# Patient Record
Sex: MALE | Race: BLACK OR AFRICAN AMERICAN | NOT HISPANIC OR LATINO | Employment: OTHER | URBAN - METROPOLITAN AREA
[De-identification: names, ages, dates, MRNs, and addresses within clinical notes are randomized per-mention and may not be internally consistent; named-entity substitution may affect disease eponyms.]

---

## 2017-05-10 ENCOUNTER — HISTORICAL (OUTPATIENT)
Dept: RADIOLOGY | Facility: HOSPITAL | Age: 62
End: 2017-05-10

## 2017-05-22 ENCOUNTER — HISTORICAL (OUTPATIENT)
Dept: SURGERY | Facility: HOSPITAL | Age: 62
End: 2017-05-22

## 2017-05-22 LAB
ALBUMIN SERPL-MCNC: 4.2 GM/DL (ref 3.4–5)
ALBUMIN/GLOB SERPL: 1.2 RATIO
ALP SERPL-CCNC: 53 UNIT/L (ref 30–113)
ALT SERPL-CCNC: 36 UNIT/L (ref 10–45)
APTT PPP: 26.1 SECOND(S) (ref 25–35)
AST SERPL-CCNC: 39 UNIT/L (ref 15–37)
BASOPHILS NFR BLD MANUAL: 0 % (ref 0–1)
BILIRUB SERPL-MCNC: 0.3 MG/DL (ref 0.1–0.9)
BILIRUBIN DIRECT+TOT PNL SERPL-MCNC: 0.1 MG/DL
BILIRUBIN DIRECT+TOT PNL SERPL-MCNC: 0.2 MG/DL (ref 0–0.3)
BUN SERPL-MCNC: 16 MG/DL (ref 10–20)
CALCIUM SERPL-MCNC: 8.9 MG/DL (ref 8–10.5)
CHLORIDE SERPL-SCNC: 105 MMOL/L (ref 100–108)
CO2 SERPL-SCNC: 28 MMOL/L (ref 21–35)
CREAT SERPL-MCNC: 0.99 MG/DL (ref 0.7–1.3)
EOSINOPHIL NFR BLD MANUAL: 0 % (ref 0–5)
ERYTHROCYTE [DISTWIDTH] IN BLOOD BY AUTOMATED COUNT: 14.1 % (ref 11.5–17)
GLOBULIN SER-MCNC: 3.6 GM/DL
GLUCOSE SERPL-MCNC: 92 MG/DL (ref 75–116)
GRANULOCYTES NFR BLD MANUAL: 79 % (ref 47–80)
HCT VFR BLD AUTO: 42.9 % (ref 42–52)
HEMOCCULT SP1 STL QL: NEGATIVE
HEMOCCULT SP2 STL QL: NEGATIVE
HGB BLD-MCNC: 13.7 GM/DL (ref 14–18)
HYPOCHROMIA BLD QL SMEAR: ABNORMAL
INR PPP: 1 (ref 0–1.2)
LYMPHOCYTES NFR BLD MANUAL: 9 % (ref 15–40)
MCH RBC QN AUTO: 26 PG (ref 27–34)
MCHC RBC AUTO-ENTMCNC: 32 GM/DL (ref 31–36)
MCV RBC AUTO: 83 FL (ref 80–99)
MONOCYTES NFR BLD MANUAL: 12 % (ref 4–12)
PLATELET # BLD AUTO: 317 X10(3)/MCL (ref 140–400)
PLATELET # BLD EST: ADEQUATE 10*3/UL
PMV BLD AUTO: 9.6 FL (ref 6.8–10)
POTASSIUM SERPL-SCNC: 3.9 MMOL/L (ref 3.6–5.2)
PROT SERPL-MCNC: 7.8 GM/DL (ref 6.4–8.2)
PROTHROMBIN TIME: 10.8 SECOND(S) (ref 9–12)
RBC # BLD AUTO: 5.18 X10(6)/MCL (ref 4.7–6.1)
SODIUM SERPL-SCNC: 142 MMOL/L (ref 135–145)
WBC # SPEC AUTO: 7.4 X10(3)/MCL (ref 4.5–11.5)

## 2017-05-25 ENCOUNTER — HISTORICAL (OUTPATIENT)
Dept: ANESTHESIOLOGY | Facility: HOSPITAL | Age: 62
End: 2017-05-25

## 2017-08-10 ENCOUNTER — HISTORICAL (OUTPATIENT)
Dept: SURGERY | Facility: HOSPITAL | Age: 62
End: 2017-08-10

## 2017-08-10 LAB
ABS NEUT (OLG): 5.9 X10(3)/MCL (ref 1.5–6.9)
ALBUMIN SERPL-MCNC: 4.4 GM/DL (ref 3.4–5)
ALBUMIN/GLOB SERPL: 1.3 RATIO
ALP SERPL-CCNC: 178 UNIT/L (ref 30–113)
ALT SERPL-CCNC: 26 UNIT/L (ref 10–45)
APTT PPP: 23.4 SECOND(S) (ref 25–35)
AST SERPL-CCNC: 28 UNIT/L (ref 15–37)
BASOPHILS # BLD AUTO: 0 X10(3)/MCL (ref 0–0.1)
BASOPHILS NFR BLD AUTO: 0 % (ref 0–1)
BILIRUB SERPL-MCNC: 0.5 MG/DL (ref 0.1–0.9)
BILIRUBIN DIRECT+TOT PNL SERPL-MCNC: 0.2 MG/DL (ref 0–0.3)
BILIRUBIN DIRECT+TOT PNL SERPL-MCNC: 0.3 MG/DL
BUN SERPL-MCNC: 18 MG/DL (ref 10–20)
CALCIUM SERPL-MCNC: 8.8 MG/DL (ref 8–10.5)
CHLORIDE SERPL-SCNC: 103 MMOL/L (ref 100–108)
CO2 SERPL-SCNC: 32 MMOL/L (ref 21–35)
CREAT SERPL-MCNC: 1.09 MG/DL (ref 0.7–1.3)
EOSINOPHIL # BLD AUTO: 0.2 X10(3)/MCL (ref 0–0.6)
EOSINOPHIL NFR BLD AUTO: 3 % (ref 0–5)
ERYTHROCYTE [DISTWIDTH] IN BLOOD BY AUTOMATED COUNT: 15.6 % (ref 11.5–17)
GLOBULIN SER-MCNC: 3.5 GM/DL
GLUCOSE SERPL-MCNC: 101 MG/DL (ref 75–116)
HCT VFR BLD AUTO: 39.3 % (ref 42–52)
HGB BLD-MCNC: 12.6 GM/DL (ref 14–18)
INR PPP: 1 (ref 0–1.2)
LYMPHOCYTES # BLD AUTO: 2 X10(3)/MCL (ref 0.5–4.1)
LYMPHOCYTES NFR BLD AUTO: 22.8 % (ref 15–40)
MCH RBC QN AUTO: 26 PG (ref 27–34)
MCHC RBC AUTO-ENTMCNC: 32 GM/DL (ref 31–36)
MCV RBC AUTO: 82 FL (ref 80–99)
MONOCYTES # BLD AUTO: 0.6 X10(3)/MCL (ref 0–1.1)
MONOCYTES NFR BLD AUTO: 7 % (ref 4–12)
NEUTROPHILS # BLD AUTO: 5.9 X10(3)/MCL (ref 1.5–6.9)
NEUTROPHILS NFR BLD AUTO: 67 % (ref 43–75)
PLATELET # BLD AUTO: 388 X10(3)/MCL (ref 140–400)
PMV BLD AUTO: 9.6 FL (ref 6.8–10)
POTASSIUM SERPL-SCNC: 4.4 MMOL/L (ref 3.6–5.2)
PROT SERPL-MCNC: 7.9 GM/DL (ref 6.4–8.2)
PROTHROMBIN TIME: 10.1 SECOND(S) (ref 9–12)
RBC # BLD AUTO: 4.78 X10(6)/MCL (ref 4.7–6.1)
SODIUM SERPL-SCNC: 139 MMOL/L (ref 135–145)
WBC # SPEC AUTO: 8.8 X10(3)/MCL (ref 4.5–11.5)

## 2017-08-21 ENCOUNTER — HISTORICAL (OUTPATIENT)
Dept: ANESTHESIOLOGY | Facility: HOSPITAL | Age: 62
End: 2017-08-21

## 2017-12-06 ENCOUNTER — HISTORICAL (OUTPATIENT)
Dept: RADIOLOGY | Facility: HOSPITAL | Age: 62
End: 2017-12-06

## 2018-09-27 ENCOUNTER — HISTORICAL (OUTPATIENT)
Dept: RADIOLOGY | Facility: HOSPITAL | Age: 63
End: 2018-09-27

## 2019-09-06 ENCOUNTER — HISTORICAL (OUTPATIENT)
Dept: LAB | Facility: HOSPITAL | Age: 64
End: 2019-09-06

## 2019-11-08 ENCOUNTER — HISTORICAL (OUTPATIENT)
Dept: RESPIRATORY THERAPY | Facility: HOSPITAL | Age: 64
End: 2019-11-08

## 2021-10-08 ENCOUNTER — HISTORICAL (OUTPATIENT)
Dept: LAB | Facility: HOSPITAL | Age: 66
End: 2021-10-08

## 2021-10-08 LAB
HEMOCCULT SP1 STL QL: POSITIVE
HEMOCCULT SP2 STL QL: POSITIVE

## 2021-10-18 ENCOUNTER — HISTORICAL (OUTPATIENT)
Dept: RADIOLOGY | Facility: HOSPITAL | Age: 66
End: 2021-10-18

## 2021-11-15 ENCOUNTER — HISTORICAL (OUTPATIENT)
Dept: LAB | Facility: HOSPITAL | Age: 66
End: 2021-11-15

## 2021-11-24 ENCOUNTER — HISTORICAL (OUTPATIENT)
Dept: RADIOLOGY | Facility: HOSPITAL | Age: 66
End: 2021-11-24

## 2021-11-24 ENCOUNTER — HISTORICAL (OUTPATIENT)
Dept: SURGERY | Facility: HOSPITAL | Age: 66
End: 2021-11-24

## 2021-11-24 LAB
ABS NEUT (OLG): 4.6 X10(3)/MCL (ref 1.5–6.9)
ALBUMIN SERPL-MCNC: 4.3 GM/DL (ref 3.4–4.8)
ALBUMIN/GLOB SERPL: 1.6 RATIO (ref 1.1–2)
ALP SERPL-CCNC: 87 UNIT/L (ref 40–150)
ALT SERPL-CCNC: 45 UNIT/L (ref 0–55)
APTT PPP: 30.4 SEC (ref 23.4–34.9)
AST SERPL-CCNC: 48 UNIT/L (ref 5–34)
BASOPHILS # BLD AUTO: 0 X10(3)/MCL (ref 0–0.1)
BASOPHILS NFR BLD AUTO: 1 % (ref 0–1)
BILIRUB SERPL-MCNC: 0.8 MG/DL
BILIRUBIN DIRECT+TOT PNL SERPL-MCNC: 0.4 MG/DL (ref 0–0.5)
BILIRUBIN DIRECT+TOT PNL SERPL-MCNC: 0.4 MG/DL (ref 0–0.8)
BUN SERPL-MCNC: 11 MG/DL (ref 8.4–25.7)
CALCIUM SERPL-MCNC: 9.6 MG/DL (ref 8.7–10.5)
CHLORIDE SERPL-SCNC: 107 MMOL/L (ref 98–107)
CO2 SERPL-SCNC: 25 MMOL/L (ref 23–31)
CREAT SERPL-MCNC: 0.85 MG/DL (ref 0.73–1.18)
EOSINOPHIL # BLD AUTO: 0.1 X10(3)/MCL (ref 0–0.6)
EOSINOPHIL NFR BLD AUTO: 2 % (ref 0–5)
ERYTHROCYTE [DISTWIDTH] IN BLOOD BY AUTOMATED COUNT: 14.7 % (ref 11.5–17)
GLOBULIN SER-MCNC: 2.7 GM/DL (ref 2.4–3.5)
GLUCOSE SERPL-MCNC: 143 MG/DL (ref 82–115)
HCT VFR BLD AUTO: 42.1 % (ref 42–52)
HGB BLD-MCNC: 13.1 GM/DL (ref 14–18)
IMM GRANULOCYTES # BLD AUTO: 0.02 10*3/UL (ref 0–0.02)
IMM GRANULOCYTES NFR BLD AUTO: 0.3 % (ref 0–0.43)
INR PPP: 1 (ref 2–3)
LYMPHOCYTES # BLD AUTO: 1.4 X10(3)/MCL (ref 0.5–4.1)
LYMPHOCYTES NFR BLD AUTO: 20 % (ref 15–40)
MCH RBC QN AUTO: 27 PG (ref 27–34)
MCHC RBC AUTO-ENTMCNC: 31 GM/DL (ref 31–36)
MCV RBC AUTO: 86 FL (ref 80–99)
MONOCYTES # BLD AUTO: 0.5 X10(3)/MCL (ref 0–1.1)
MONOCYTES NFR BLD AUTO: 8 % (ref 4–12)
NEUTROPHILS # BLD AUTO: 4.6 X10(3)/MCL (ref 1.5–6.9)
NEUTROPHILS NFR BLD AUTO: 69 % (ref 43–75)
PLATELET # BLD AUTO: 294 X10(3)/MCL (ref 140–400)
PMV BLD AUTO: 10.4 FL (ref 6.8–10)
POTASSIUM SERPL-SCNC: 3.7 MMOL/L (ref 3.5–5.1)
PROT SERPL-MCNC: 7 GM/DL (ref 5.8–7.6)
PROTHROMBIN TIME: 13.4 SEC (ref 11.7–14.5)
RBC # BLD AUTO: 4.9 X10(6)/MCL (ref 4.7–6.1)
SODIUM SERPL-SCNC: 143 MMOL/L (ref 136–145)
WBC # SPEC AUTO: 6.6 X10(3)/MCL (ref 4.5–11.5)

## 2021-11-29 ENCOUNTER — HISTORICAL (OUTPATIENT)
Dept: ANESTHESIOLOGY | Facility: HOSPITAL | Age: 66
End: 2021-11-29

## 2021-12-02 ENCOUNTER — HISTORICAL (OUTPATIENT)
Dept: ANESTHESIOLOGY | Facility: HOSPITAL | Age: 66
End: 2021-12-02

## 2021-12-09 ENCOUNTER — HISTORICAL (OUTPATIENT)
Dept: RADIOLOGY | Facility: HOSPITAL | Age: 66
End: 2021-12-09

## 2022-01-26 ENCOUNTER — HISTORICAL (OUTPATIENT)
Dept: RADIOLOGY | Facility: HOSPITAL | Age: 67
End: 2022-01-26

## 2022-02-01 ENCOUNTER — HISTORICAL (OUTPATIENT)
Dept: ADMINISTRATIVE | Facility: HOSPITAL | Age: 67
End: 2022-02-01

## 2022-02-01 ENCOUNTER — HISTORICAL (OUTPATIENT)
Dept: RADIOLOGY | Facility: HOSPITAL | Age: 67
End: 2022-02-01

## 2022-02-07 ENCOUNTER — HISTORICAL (OUTPATIENT)
Dept: LAB | Facility: HOSPITAL | Age: 67
End: 2022-02-07

## 2022-04-30 NOTE — OP NOTE
ADMITTING DIAGNOSIS:  Need for age-appropriate screening colonoscopy.    PROCEDURE:  Colonoscopy to the cecum with intubation of ileocecal valve.    POSTOPERATIVE DIAGNOSES:    1. Normal terminal ileum.   2. Normal colonoscopy.  3. Grade 2 internal hemorrhoids.    DESCRIPTION OF PROCEDURE:  Patient is a 62-year-old  male with hypertension, had a colonoscopy in 2009, needed a followup screening colonoscopy.  Referred for the purpose of screening colonoscopy.  Patient was brought to the GI suite, underwent sedation and examination of the colon all the way to the cecum.  The ileocecal valve was briefly intubated, found to be within normal limits.  Cecum, ascending colon, transverse colon, descending colon, rectosigmoid were unremarkable.  Digital exam reveals good tone.  No masses.  No other abnormalities were seen.  Overall, the patient did very well.  Had no problems or difficulties, was awakened and sent to recovery in good condition.  I appreciate the consultation referral from his primary care provider, Ms Zoie Doe, and will notify her of my findings.        HENRY/SHELLIE   DD: 08/21/2017 1819   DT: 08/21/2017 1839  Job # [id number]    cc: Zoie Doe N.P.

## 2022-04-30 NOTE — OP NOTE
DATE OF SURGERY:        SURGEON:  Shai Horn M.D.    ADMITTING DIAGNOSIS:  Occult positive stools in need of colonoscopic evaluation.    POSTOPERATIVE DIAGNOSES:    1. Normal terminal ileum up to 10 cm.   2. Normal colonoscopy with mild diverticulosis.   3. Grade 2 internal hemorrhoids.    BRIEF HISTORY:  The patient is a 66-year-old  male with hypertension. Had a colonoscopy in 2009.  Recently developed occult positive stools, 2/3 were positive for blood on 10/08/2021. The patient was referred to me for endoscopic evaluation of the colon all the way to the cecum with intubation of ileocecal valve.  The terminal ileum was normal.       The patient had examination of the colon all the way to the cecum with intubation of ileocecal valve.  Terminal ileum was normal up to 10 cm.  The cecum, ascending colon, transverse colon, descending colon were normal. Rectosigmoid had some mild diverticulosis.  Grade 2 internal hemorrhoids were seen. No other pathology was visualized.  Overall, the patient did very well. Had no problems or difficulties.  Was awakened and sent to Recovery in good condition.    PLAN:    1. Follow up in the office to discuss results.   2. Follow up in 2 years to recheck stools for blood.   3. Follow up in 10 years for repeat screening colonoscopy.   4. Check an EGD on 12/02/2021.    5.   Check a stool PCR as well.    6. I will notify Mr. Cantrell of my findings.        BBS/MODL   DD: 11/29/2021 1224   DT: 11/29/2021 1241  Job # [id number]    cc: Javon Cantrell

## 2022-04-30 NOTE — OP NOTE
PREOPERATIVE DIAGNOSIS:  Supraumbilical ventral hernia.    PROCEDURE:  Laparoscopic repair of a supraumbilical ventral hernia with a 9 cm __________mesh.    POSTOPERATIVE DIAGNOSIS:  Successful laparoscopic repair of an incarcerated supraumbilical abdominal hernia with no obstruction and no gangrenous bowel.  Only with incarcerated omentum and preperitoneal fat.    INDICATIONS:  The patient is a 61-year-old  male with a history of hypertension, who had a ventral hernia that was 2 cm in size that required repair.    PROCEDURE IN DETAIL:  The patient was brought to the operating room.  Supine position.  General anesthetic.  Prepped and draped in a sterile fashion.  Had a Visiport inserted in the left upper quadrant.  Then two 5 mm trocars placed in the right and left lower quadrants.  The supraumbilical hernia was identified and reduced laparoscopically and then had preperitoneal space dissected out.  Once this was done, the patient underwent placement of a 9 cm __________mesh over what appeared to be a 2 cm defect after the preperitoneal contents were reduced.  The patient had the mesh tacked with ProTack and SecureStrap and then had peritoneum covering the nonadhesive side and the adhesive side was toward the fascia.  The patient had good securing of the mesh and once this was done, the aponeuroses of the two 5 mm trocar sites and of the 10 mm trocar sites were closed with 0 Vicryl on a  needle.  Subcu was closed with 4-0 plain gut suture.  Dermabond was used for the skin.  Sterile dressings were applied.  No problems encountered.  The patient tolerated the procedure well.       I appreciate the consultation referral from his primary care provider, Ms Zoie Doe.      HENRY/SHELLIE   DD: 05/25/2017 1606   DT: 05/25/2017 1622  Job # [id number]    cc: Zoie Doe N.P.

## 2022-04-30 NOTE — OP NOTE
DATE OF SURGERY:        SURGEON:  Shai Horn M.D.    ADMITTING DIAGNOSES:    1. Occult positive stools, etiology unclear.  2. Right upper quadrant abdominal pain with diarrhea.  3. 10/08/2021 stools for blood positive 2/3.    PROCEDURE:  Esophagogastroduodenoscopy with biopsy of the antrum.    POSTOPERATIVE DIAGNOSES:    1. Normal duodenum in all 4 portions and into the jejunum.  2. Normal antrum, fundus and cardia of the stomach.  Biopsy of the antrum taken for histopathology and Helicobacter pylori.  Z-line at 40 cm.    3. No hiatal hernia with grade 1 esophageal varices.  No active bleeding.  4. Normal esophagus, cricopharyngeus muscle and vocal cords proximally and mid body.    5. There is no obvious active source of bleeding.    INDICATIONS:  The patient is a 66-year-old  male with a history of hypertension who had a colonoscopy in 2009, recently had stools for blood that were 2/3 positive on 10/08/2021.  He has right upper quadrant pain and associated diarrhea.  No nausea, bloating, or vomiting.  Patient was referred to me for endoscopic evaluation by Mr. Javon Figueredo.  The patient had stool PCRs checked.  Colonoscopy was scheduled for the 29th.  Completion EGD was also scheduled.    PROCEDURE IN DETAIL:  The patient was brought to the GI suite, underwent sedation and examination of esophagus, stomach and duodenum.  Duodenum was normal in all 4 portions and into the jejunum.  The pylorus was intubated without difficulty.  Antrum, fundus, and cardia of the stomach were normal.  The Z-line was at 40 cm.  There was no hiatal hernia.  There was grade 1 esophageal varices distally on the esophagus.  The midbody and proximal esophagus were normal.  Cricopharyngeus muscle and vocal cords were normal throughout.  No other abnormalities were seen.  Overall, the patient did very well, had no problems or difficulties.    PLAN:    1. Check stool PCRs.  2. Review EGD results and  colonoscopy in the office for further discussion as to possible source of occult bleeding.  3. Further review of his right upper quadrant tenderness with ultrasound HIDA scan.      I appreciate the consultation referral from . Javon Figueredo.        HENRY/SHELLIE   DD: 12/02/2021 1104   DT: 12/02/2021 1146  Job # [id number]    cc: Javon Figueredo

## 2022-12-12 ENCOUNTER — HOSPITAL ENCOUNTER (EMERGENCY)
Facility: HOSPITAL | Age: 67
Discharge: HOME | End: 2022-12-12
Attending: INTERNAL MEDICINE
Payer: MEDICARE

## 2022-12-12 VITALS
OXYGEN SATURATION: 100 % | SYSTOLIC BLOOD PRESSURE: 149 MMHG | TEMPERATURE: 98 F | RESPIRATION RATE: 18 BRPM | WEIGHT: 149.38 LBS | HEART RATE: 76 BPM | DIASTOLIC BLOOD PRESSURE: 78 MMHG
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
## 2022-12-12 DIAGNOSIS — S52.502A CLOSED FRACTURE OF DISTAL END OF LEFT RADIUS, UNSPECIFIED FRACTURE MORPHOLOGY, INITIAL ENCOUNTER: Primary | ICD-10-CM

## 2022-12-12 PROCEDURE — 25000003 PHARM REV CODE 250: Performed by: PHYSICIAN ASSISTANT

## 2022-12-12 PROCEDURE — 99283 EMERGENCY DEPT VISIT LOW MDM: CPT | Mod: 25

## 2022-12-12 RX ORDER — HYDROCODONE BITARTRATE AND ACETAMINOPHEN 10; 325 MG/1; MG/1
1 TABLET ORAL EVERY 6 HOURS PRN
Qty: 20 TABLET | Refills: 0 | Status: SHIPPED | OUTPATIENT
Start: 2022-12-12 | End: 2022-12-17

## 2022-12-12 RX ORDER — HYDROCODONE BITARTRATE AND ACETAMINOPHEN 10; 325 MG/1; MG/1
1 TABLET ORAL
Status: COMPLETED | OUTPATIENT
Start: 2022-12-12 | End: 2022-12-12

## 2022-12-12 RX ADMIN — HYDROCODONE BITARTRATE AND ACETAMINOPHEN 1 TABLET: 10; 325 TABLET ORAL at 10:12

## 2022-12-13 NOTE — DISCHARGE INSTRUCTIONS
Ice and elevate, 20 minutes on and 20 minutes off.  Keep arm in splint.  May use Norco for severe pain.  Do not drink or drive while taking narcotics as can be sedating.  Call Orthopedics tomorrow for follow-up appointment.

## 2022-12-13 NOTE — ED PROVIDER NOTES
Encounter Date: 12/12/2022       History     Chief Complaint   Patient presents with    Fall     C/o L wrist pain after trip and fall just PTA. Minor swelling noted.     67-year-old male presents to ED for evaluation after trip and fall.  Patient reports that he fell on outstretched left hand.  Denies hitting his head or loss of consciousness.  Complains of pain and swelling to left wrist.  Denies any nausea or vomiting.    The history is provided by the patient. No  was used.   Review of patient's allergies indicates:  No Known Allergies  Past Medical History:   Diagnosis Date    Hypertension      No past surgical history on file.  No family history on file.  Social History     Tobacco Use    Smoking status: Never    Smokeless tobacco: Never     Review of Systems   Constitutional:  Negative for chills, fatigue and fever.   HENT:  Negative for sore throat.    Respiratory:  Negative for cough and shortness of breath.    Cardiovascular:  Negative for chest pain.   Gastrointestinal:  Negative for abdominal pain, nausea and vomiting.   Genitourinary:  Negative for dysuria and frequency.   Musculoskeletal:  Positive for arthralgias, joint swelling and myalgias. Negative for back pain and neck pain.   Skin:  Negative for rash.   Neurological:  Negative for dizziness, weakness and headaches.   Hematological:  Does not bruise/bleed easily.   All other systems reviewed and are negative.    Physical Exam     Initial Vitals [12/12/22 2029]   BP Pulse Resp Temp SpO2   (!) 157/66 73 18 97.8 °F (36.6 °C) 99 %      MAP       --         Physical Exam    Nursing note and vitals reviewed.  Constitutional: He appears well-developed and well-nourished. He is cooperative.   HENT:   Head: Normocephalic and atraumatic.   Right Ear: External ear normal.   Left Ear: External ear normal.   Mouth/Throat: Oropharynx is clear and moist.   Eyes: Conjunctivae are normal. Pupils are equal, round, and reactive to light.   Neck:  Neck supple.   Normal range of motion.  Cardiovascular:  Normal rate, regular rhythm and normal heart sounds.           Pulmonary/Chest: Breath sounds normal. No respiratory distress. He has no wheezes. He has no rhonchi. He has no rales.   Abdominal: Abdomen is soft. Bowel sounds are normal. There is no abdominal tenderness.   Musculoskeletal:      Left wrist: Swelling, deformity and tenderness present. Decreased range of motion.        Arms:       Cervical back: Normal range of motion and neck supple.      Comments: Radial pulses 2+. All other adjacent joints otherwise normal       Neurological: He is alert and oriented to person, place, and time. He has normal strength. No cranial nerve deficit or sensory deficit. GCS score is 15. GCS eye subscore is 4. GCS verbal subscore is 5. GCS motor subscore is 6.   Skin: Skin is warm and dry. Capillary refill takes less than 2 seconds.   Psychiatric: He has a normal mood and affect.       ED Course   Procedures  Labs Reviewed - No data to display       Imaging Results               X-Ray Wrist Complete Left (Preliminary result)  Result time 12/12/22 21:58:22      ED Interpretation by ANTOINETTE Burrows (12/12/22 21:58:22, [facility]) Flagged as Abnormal    XR left wrist: left distal radius fracture noted                                     Medications   HYDROcodone-acetaminophen  mg per tablet 1 tablet (1 tablet Oral Given 12/12/22 2200)     Medical Decision Making:   Differential Diagnosis:   Fall, sprain, fracture  Clinical Tests:   Radiological Study: Ordered and Reviewed  ED Management:  67-year-old male presents to ED after trip and fall with left wrist pain.  Denies hitting his head or loss of consciousness.  GCS 15 and neuro intact.  X-ray showing left distal radius fracture.  Placed in splint.  Neurovascular intact  Will give short course of pain medication.  Discussed follow-up with Orthopedics, all questions  answered.  Patient verbalizes understanding and agrees with plan of care                        Clinical Impression:   Final diagnoses:  [W19.XXXA] Fall  [S52.502A] Closed fracture of distal end of left radius, unspecified fracture morphology, initial encounter (Primary)        ED Disposition Condition    Discharge Stable          ED Prescriptions       Medication Sig Dispense Start Date End Date Auth. Provider    HYDROcodone-acetaminophen (NORCO)  mg per tablet Take 1 tablet by mouth every 6 (six) hours as needed for Pain. 20 tablet 12/12/2022 12/17/2022 ANTOINETTE Burrows          Follow-up Information       Follow up With Specialties Details Why Contact Info    Javon Becerra NP Family Medicine   [address] [facility] [address]  [phone]      Eugenio Bronson MD Orthopedic Surgery Call   [address]  [place] [address]  [phone]               ANTOINETTE Burrows  12/12/22 4051

## 2023-01-09 ENCOUNTER — HOSPITAL ENCOUNTER (OUTPATIENT)
Dept: RADIOLOGY | Facility: HOSPITAL | Age: 68
Discharge: HOME | End: 2023-01-09
Attending: NURSE PRACTITIONER
Payer: MEDICARE

## 2023-01-09 DIAGNOSIS — S62.102A LEFT WRIST FRACTURE, CLOSED, INITIAL ENCOUNTER: ICD-10-CM

## 2023-01-09 PROCEDURE — 73110 X-RAY EXAM OF WRIST: CPT | Mod: TC,LT

## 2023-05-12 ENCOUNTER — HOSPITAL ENCOUNTER (OUTPATIENT)
Dept: RADIOLOGY | Facility: HOSPITAL | Age: 68
Discharge: HOME | End: 2023-05-12
Attending: NURSE PRACTITIONER
Payer: MEDICARE

## 2023-05-12 ENCOUNTER — CLINICAL SUPPORT (OUTPATIENT)
Dept: RESPIRATORY THERAPY | Facility: HOSPITAL | Age: 68
End: 2023-05-12
Attending: NURSE PRACTITIONER
Payer: MEDICARE

## 2023-05-12 DIAGNOSIS — I10 HYPERTENSION, ESSENTIAL: ICD-10-CM

## 2023-05-12 DIAGNOSIS — I10 ESSENTIAL HYPERTENSION, MALIGNANT: ICD-10-CM

## 2023-05-12 DIAGNOSIS — I10 ESSENTIAL HYPERTENSION, MALIGNANT: Primary | ICD-10-CM

## 2023-05-12 PROCEDURE — 93005 ELECTROCARDIOGRAM TRACING: CPT

## 2023-05-12 PROCEDURE — 93010 ELECTROCARDIOGRAM REPORT: CPT | Mod: ,,, | Performed by: INTERNAL MEDICINE

## 2023-05-12 PROCEDURE — 93010 EKG 12-LEAD: ICD-10-PCS | Mod: ,,, | Performed by: INTERNAL MEDICINE

## 2023-05-12 PROCEDURE — 71046 X-RAY EXAM CHEST 2 VIEWS: CPT | Mod: TC

## 2024-05-10 DIAGNOSIS — R63.4 WEIGHT LOSS: Primary | ICD-10-CM

## 2024-07-25 ENCOUNTER — HOSPITAL ENCOUNTER (OUTPATIENT)
Dept: RADIOLOGY | Facility: HOSPITAL | Age: 69
Discharge: HOME | End: 2024-07-25
Attending: NURSE PRACTITIONER
Payer: MEDICARE

## 2024-07-25 DIAGNOSIS — M54.50 LOW BACK PAIN AT MULTIPLE SITES: ICD-10-CM

## 2024-07-25 PROCEDURE — 72070 X-RAY EXAM THORAC SPINE 2VWS: CPT | Mod: TC

## 2024-07-25 PROCEDURE — 72100 X-RAY EXAM L-S SPINE 2/3 VWS: CPT | Mod: TC

## 2024-10-02 DIAGNOSIS — R22.41 NODULE OF SKIN OF RIGHT FOOT: ICD-10-CM

## 2024-10-02 DIAGNOSIS — M79.671 RIGHT FOOT PAIN: Primary | ICD-10-CM

## 2024-11-11 ENCOUNTER — HOSPITAL ENCOUNTER (OUTPATIENT)
Dept: RADIOLOGY | Facility: HOSPITAL | Age: 69
Discharge: HOME | End: 2024-11-11
Attending: NURSE PRACTITIONER
Payer: MEDICARE

## 2024-11-11 DIAGNOSIS — R22.41 NODULE OF SKIN OF RIGHT FOOT: ICD-10-CM

## 2024-11-11 DIAGNOSIS — M79.671 RIGHT FOOT PAIN: ICD-10-CM

## 2024-11-11 PROCEDURE — 76882 US LMTD JT/FCL EVL NVASC XTR: CPT | Mod: TC,RT

## 2025-05-16 DIAGNOSIS — D64.9 ANEMIA, UNSPECIFIED TYPE: Primary | ICD-10-CM

## 2025-06-02 ENCOUNTER — OFFICE VISIT (OUTPATIENT)
Facility: CLINIC | Age: 70
End: 2025-06-02
Payer: MEDICARE

## 2025-06-02 VITALS
RESPIRATION RATE: 18 BRPM | HEIGHT: 68 IN | HEART RATE: 65 BPM | DIASTOLIC BLOOD PRESSURE: 65 MMHG | WEIGHT: 140 LBS | BODY MASS INDEX: 21.22 KG/M2 | OXYGEN SATURATION: 100 % | TEMPERATURE: 98 F | SYSTOLIC BLOOD PRESSURE: 122 MMHG

## 2025-06-02 DIAGNOSIS — D64.9 ANEMIA, UNSPECIFIED TYPE: ICD-10-CM

## 2025-06-02 DIAGNOSIS — E53.8 FOLATE DEFICIENCY: Primary | ICD-10-CM

## 2025-06-02 PROBLEM — K21.9 GASTROESOPHAGEAL REFLUX DISEASE: Status: ACTIVE | Noted: 2025-06-02

## 2025-06-02 PROBLEM — I10 ESSENTIAL HYPERTENSION: Status: ACTIVE | Noted: 2025-06-02

## 2025-06-02 PROBLEM — M41.9 SCOLIOSIS: Status: ACTIVE | Noted: 2025-06-02

## 2025-06-02 PROBLEM — E78.5 HYPERLIPIDEMIA: Status: ACTIVE | Noted: 2025-06-02

## 2025-06-02 PROBLEM — F10.10 ALCOHOL ABUSE: Status: ACTIVE | Noted: 2025-06-02

## 2025-06-02 PROBLEM — J30.2 SEASONAL ALLERGIC RHINITIS: Status: ACTIVE | Noted: 2025-06-02

## 2025-06-02 PROBLEM — N52.9 ERECTILE DYSFUNCTION: Status: ACTIVE | Noted: 2025-06-02

## 2025-06-02 PROBLEM — M81.0 AGE-RELATED OSTEOPOROSIS WITHOUT CURRENT PATHOLOGICAL FRACTURE: Status: ACTIVE | Noted: 2025-06-02

## 2025-06-02 PROCEDURE — 1159F MED LIST DOCD IN RCRD: CPT | Mod: CPTII,S$GLB,, | Performed by: INTERNAL MEDICINE

## 2025-06-02 PROCEDURE — 99203 OFFICE O/P NEW LOW 30 MIN: CPT | Mod: S$GLB,,, | Performed by: INTERNAL MEDICINE

## 2025-06-02 PROCEDURE — 3288F FALL RISK ASSESSMENT DOCD: CPT | Mod: CPTII,S$GLB,, | Performed by: INTERNAL MEDICINE

## 2025-06-02 PROCEDURE — 3074F SYST BP LT 130 MM HG: CPT | Mod: CPTII,S$GLB,, | Performed by: INTERNAL MEDICINE

## 2025-06-02 PROCEDURE — 3078F DIAST BP <80 MM HG: CPT | Mod: CPTII,S$GLB,, | Performed by: INTERNAL MEDICINE

## 2025-06-02 PROCEDURE — 3008F BODY MASS INDEX DOCD: CPT | Mod: CPTII,S$GLB,, | Performed by: INTERNAL MEDICINE

## 2025-06-02 PROCEDURE — 1126F AMNT PAIN NOTED NONE PRSNT: CPT | Mod: CPTII,S$GLB,, | Performed by: INTERNAL MEDICINE

## 2025-06-02 PROCEDURE — 99999 PR PBB SHADOW E&M-EST. PATIENT-LVL IV: CPT | Mod: PBBFAC,,, | Performed by: INTERNAL MEDICINE

## 2025-06-02 PROCEDURE — 1101F PT FALLS ASSESS-DOCD LE1/YR: CPT | Mod: CPTII,S$GLB,, | Performed by: INTERNAL MEDICINE

## 2025-06-02 RX ORDER — DICLOFENAC SODIUM 75 MG/1
TABLET, DELAYED RELEASE ORAL
COMMUNITY

## 2025-06-02 RX ORDER — LORATADINE 10 MG/1
TABLET ORAL
COMMUNITY

## 2025-06-02 RX ORDER — FLUTICASONE PROPIONATE 50 MCG
SPRAY, SUSPENSION (ML) NASAL
COMMUNITY

## 2025-06-02 RX ORDER — FOLIC ACID 1 MG/1
1 TABLET ORAL DAILY
Qty: 100 TABLET | Refills: 3 | Status: SHIPPED | OUTPATIENT
Start: 2025-06-02 | End: 2026-06-02

## 2025-06-02 RX ORDER — PANTOPRAZOLE SODIUM 40 MG/1
TABLET, DELAYED RELEASE ORAL
COMMUNITY

## 2025-06-02 RX ORDER — HYDROCHLOROTHIAZIDE 12.5 MG/1
TABLET ORAL
COMMUNITY

## 2025-06-02 RX ORDER — TIZANIDINE 4 MG/1
TABLET ORAL
COMMUNITY

## 2025-06-02 RX ORDER — TADALAFIL 5 MG/1
TABLET ORAL
COMMUNITY

## 2025-06-02 RX ORDER — AMLODIPINE BESYLATE 10 MG/1
TABLET ORAL
COMMUNITY

## 2025-06-02 RX ORDER — MELOXICAM 15 MG/1
TABLET ORAL
COMMUNITY

## 2025-06-02 RX ORDER — ATORVASTATIN CALCIUM 20 MG/1
TABLET, FILM COATED ORAL
COMMUNITY

## 2025-07-01 ENCOUNTER — LAB VISIT (OUTPATIENT)
Dept: LAB | Facility: HOSPITAL | Age: 70
End: 2025-07-01
Attending: INTERNAL MEDICINE
Payer: MEDICARE

## 2025-07-01 ENCOUNTER — OFFICE VISIT (OUTPATIENT)
Facility: CLINIC | Age: 70
End: 2025-07-01
Payer: MEDICARE

## 2025-07-01 VITALS
HEART RATE: 62 BPM | DIASTOLIC BLOOD PRESSURE: 59 MMHG | TEMPERATURE: 98 F | OXYGEN SATURATION: 99 % | WEIGHT: 141.5 LBS | RESPIRATION RATE: 18 BRPM | HEIGHT: 68 IN | BODY MASS INDEX: 21.44 KG/M2 | SYSTOLIC BLOOD PRESSURE: 113 MMHG

## 2025-07-01 DIAGNOSIS — E53.8 FOLATE DEFICIENCY: ICD-10-CM

## 2025-07-01 DIAGNOSIS — D64.9 NORMOCYTIC ANEMIA: Primary | ICD-10-CM

## 2025-07-01 DIAGNOSIS — D64.9 ANEMIA, UNSPECIFIED TYPE: ICD-10-CM

## 2025-07-01 LAB
BASOPHILS # BLD AUTO: 0.03 X10(3)/MCL
BASOPHILS NFR BLD AUTO: 0.4 %
EOSINOPHIL # BLD AUTO: 0.12 X10(3)/MCL (ref 0–0.9)
EOSINOPHIL NFR BLD AUTO: 1.6 %
ERYTHROCYTE [DISTWIDTH] IN BLOOD BY AUTOMATED COUNT: 14 % (ref 11.5–17)
FOLATE SERPL-MCNC: >80 NG/ML (ref 7–31.4)
HCT VFR BLD AUTO: 39.8 % (ref 42–52)
HGB BLD-MCNC: 12.7 G/DL (ref 14–18)
IMM GRANULOCYTES # BLD AUTO: 0.03 X10(3)/MCL (ref 0–0.04)
IMM GRANULOCYTES NFR BLD AUTO: 0.4 %
LYMPHOCYTES # BLD AUTO: 1.93 X10(3)/MCL (ref 0.6–4.6)
LYMPHOCYTES NFR BLD AUTO: 25.5 %
MCH RBC QN AUTO: 27.1 PG (ref 27–31)
MCHC RBC AUTO-ENTMCNC: 31.9 G/DL (ref 33–36)
MCV RBC AUTO: 84.9 FL (ref 80–94)
MONOCYTES # BLD AUTO: 0.64 X10(3)/MCL (ref 0.1–1.3)
MONOCYTES NFR BLD AUTO: 8.4 %
NEUTROPHILS # BLD AUTO: 4.83 X10(3)/MCL (ref 2.1–9.2)
NEUTROPHILS NFR BLD AUTO: 63.7 %
NRBC BLD AUTO-RTO: 0 %
PLATELET # BLD AUTO: 289 X10(3)/MCL (ref 130–400)
PMV BLD AUTO: 9.8 FL (ref 7.4–10.4)
RBC # BLD AUTO: 4.69 X10(6)/MCL (ref 4.7–6.1)
TESTOST SERPL-MCNC: 428.31 NG/DL (ref 220.91–715.81)
WBC # BLD AUTO: 7.58 X10(3)/MCL (ref 4.5–11.5)

## 2025-07-01 PROCEDURE — 36415 COLL VENOUS BLD VENIPUNCTURE: CPT

## 2025-07-01 PROCEDURE — 1101F PT FALLS ASSESS-DOCD LE1/YR: CPT | Mod: CPTII,S$GLB,, | Performed by: INTERNAL MEDICINE

## 2025-07-01 PROCEDURE — 82746 ASSAY OF FOLIC ACID SERUM: CPT

## 2025-07-01 PROCEDURE — 3078F DIAST BP <80 MM HG: CPT | Mod: CPTII,S$GLB,, | Performed by: INTERNAL MEDICINE

## 2025-07-01 PROCEDURE — 1159F MED LIST DOCD IN RCRD: CPT | Mod: CPTII,S$GLB,, | Performed by: INTERNAL MEDICINE

## 2025-07-01 PROCEDURE — 85025 COMPLETE CBC W/AUTO DIFF WBC: CPT

## 2025-07-01 PROCEDURE — G2211 COMPLEX E/M VISIT ADD ON: HCPCS | Mod: S$GLB,,, | Performed by: INTERNAL MEDICINE

## 2025-07-01 PROCEDURE — 3074F SYST BP LT 130 MM HG: CPT | Mod: CPTII,S$GLB,, | Performed by: INTERNAL MEDICINE

## 2025-07-01 PROCEDURE — 84403 ASSAY OF TOTAL TESTOSTERONE: CPT

## 2025-07-01 PROCEDURE — 3288F FALL RISK ASSESSMENT DOCD: CPT | Mod: CPTII,S$GLB,, | Performed by: INTERNAL MEDICINE

## 2025-07-01 PROCEDURE — 1126F AMNT PAIN NOTED NONE PRSNT: CPT | Mod: CPTII,S$GLB,, | Performed by: INTERNAL MEDICINE

## 2025-07-01 PROCEDURE — 99213 OFFICE O/P EST LOW 20 MIN: CPT | Mod: S$GLB,,, | Performed by: INTERNAL MEDICINE

## 2025-07-01 PROCEDURE — 99999 PR PBB SHADOW E&M-EST. PATIENT-LVL IV: CPT | Mod: PBBFAC,,, | Performed by: INTERNAL MEDICINE

## 2025-07-01 PROCEDURE — 3008F BODY MASS INDEX DOCD: CPT | Mod: CPTII,S$GLB,, | Performed by: INTERNAL MEDICINE

## 2025-07-01 NOTE — PROGRESS NOTES
[facility] Hematology/Oncology  PROGRESS NOTE      Subjective:         NAME: Delvis Abdalla : 1955     69 y.o. male   MRN: [id number]    Referring Doc: No ref. provider found  Other Physicians:  Reason for referral:  Anemia  Date in Initial consultation: 25  Chief Complaint:    No chief complaint on file.    History of Present Illness:   Mr. Abdalla presented to his PCP Javon Becerra on  for refill of medications for his multiple chronic conditions which include HTN, hyperlipidemia, hypothyroidism, erectile dysfunction, spondylosis, arthritis, GERD, alcohol use,.  He was noted to have a mild normocytic anemia which was a variance from a normal CBC in 2025.  The patient drinks 32 oz of beer a night.  At his initial evaluation 25 he was found to have a low folate and was initiated on oral folate on 25. HGB improved from 12.0 to 12.7 over the month.  LABS:  25:     Latest Reference Range & Units 25 10:54   WBC 4.50 - 11.50 x10(3)/mcL 7.58   RBC 4.70 - 6.10 x10(6)/mcL 4.69 (L)   Hemoglobin 14.0 - 18.0 g/dL 12.7 (L)   Hematocrit 42.0 - 52.0 % 39.8 (L)   MCV 80.0 - 94.0 fL 84.9   MCH 27.0 - 31.0 pg 27.1   MCHC 33.0 - 36.0 g/dL 31.9 (L)   RDW 11.5 - 17.0 % 14.0   Platelet Count 130 - 400 x10(3)/mcL 289     25 Ferritin 128, total iron 132, iron saturation 48   WBC 4.3 HGB 12.0 HCT 37.2 MCV 84.7, , normal differential, Retic 1.0, haptoglobin 116, folate low at 5.3, B12 464, TSH 0.66, CMP WNL, UA negative, PSA 2.5,  2025 WBC 5.9, hemoglobin HGB 13.2, HCT 41.3, MCV 86,      Interval History:  Feels no different. Still drinking beer. Is taking his folate    ROS:   GEN: normal without any fever, night sweats or weight loss  HEENT: normal with no HA's, sore throat, stiff neck, changes in vision  CV: normal with no CP, SOB, PND, LORENZO or orthopnea  PULM: normal with no SOB, cough, hemoptysis, sputum or pleuritic pain  GI: normal with no abdominal pain, nausea,  vomiting, constipation, diarrhea, melanotic stools, BRBPR, or hematemesis  : normal with no hematuria, dysuria  BREAST: normal with no mass, discharge, pain  SKIN: normal with no rash, erythema, bruising, or swelling    Allergies:  Review of patient's allergies indicates:  No Known Allergies    Medications:  Current Medications[1]    PMHx/PSHx Updates:   Past Medical History:   Diagnosis Date    Gastroesophageal reflux disease     Hypertension     Insomnia     Low TSH level     Male erectile dysfunction, unspecified     Mixed hyperlipidemia     Nodule of skin of right foot     Osteopenia     PSA elevation     Seasonal allergies     Spondylosis of spine at multiple levels      Past Surgical History:   Procedure Laterality Date    WRIST ARTHROPLASTY Left        Family History:  Family History   Problem Relation Name Age of Onset    Diabetes Mother      Stroke Mother      Breast cancer Sister      Breast cancer Sister         Social History:  Social History     Socioeconomic History    Marital status:    Tobacco Use    Smoking status: Never    Smokeless tobacco: Never   Vaping Use    Vaping status: Never Used         Pathology:        Objective:     ECOG SCORE             Vitals:  There were no vitals taken for this visit.    Physical Examination:   GEN: no apparent distress, comfortable; AAOx3  HEAD: atraumatic and normocephalic,   EYES: no pallor, no icterus, PERRLA  ENT: very poor dentitionOMM, no pharyngeal erythema, external ears WNL; no nasal discharge; no thrush  NECK: no masses, thyroid normal, trachea midline, no LAD/LN's, supple  CV: RRR with no murmur; normal pulse; normal S1 and S2; no pedal edema  CHEST: Normal respiratory effort; CTAB; normal breath sounds; no wheeze or crackles  ABDOM: nontender and nondistended; soft; normal bowel sounds; no rebound/guarding  MUSC/Skeletal: ROM normal; no crepitus; joints normal; no deformities or arthropathy  EXTREM: no clubbing, cyanosis, inflammation or  swelling  SKIN: no rashes, lesions, ulcers, petechiae or subcutaneous nodules  : no marcelino  NEURO: grossly intact; motor/sensory WNL; AAOx3; no tremors  PSYCH: normal mood, affect and behavior  LYMPH: normal cervical, supraclavicular, axillary and groin LN's      Labs:     Radiology/Diagnostic Studies:    No results found.    I have reviewed all available lab results and radiology reports.    Assessment/Plan:       PLAN:  Continue folinic acid 1 mg. RTC in 3 month with CBC folate and testosterone level. Asked patient to cut down on his alcohol intake but I do not think he will do it.     Discussion:     I have explained all of the above in detail and the patient understands all of the current recommendation(s). I have answered all of their questions to the best of my ability and to their complete satisfaction.   The patient is to continue with the current management plan.    I spent a total of 35 minutes on the day of the visit.This includes face to face time 25 minutes and non-face to face time preparing and charting 10 minutes to see the patient (eg, review of tests), obtaining and/or reviewing separately obtained history, documenting clinical information in the electronic or other health record, independently interpreting results and communicating results to the patient/family/caregiver, or care coordinator.          Electronically signed by Taylor Louis MD           [1]   Current Outpatient Medications:     amLODIPine (NORVASC) 10 MG tablet, 1 tablet Orally Once a day for 90 day(s), Disp: , Rfl:     atorvastatin (LIPITOR) 20 MG tablet, 1 tablet Orally Once a day for 90 day(s), Disp: , Rfl:     diclofenac (VOLTAREN) 75 MG EC tablet, 1 tablet with food or milk as needed Orally Twice a day for 30 days, Disp: , Rfl:     fluticasone propionate (FLONASE) 50 mcg/actuation nasal spray, 1 spray in each nostril Nasally Once a day for 30 days, Disp: , Rfl:     folic acid (FOLVITE) 1 MG tablet, Take 1 tablet (1 mg  total) by mouth once daily., Disp: 100 tablet, Rfl: 3    hydroCHLOROthiazide 12.5 MG Tab, 1 tablet in the morning Orally Once a day for 90 days, Disp: , Rfl:     loratadine (CLARITIN) 10 mg tablet, 1 tablet Orally Once a day for 90 days, Disp: , Rfl:     meloxicam (MOBIC) 15 MG tablet, 1 tablet Orally Once a day for 90 days, Disp: , Rfl:     PROTONIX 40 mg tablet, 1 tablet Orally Once a day for 90 day(s), Disp: , Rfl:     tadalafiL (CIALIS) 5 MG tablet, 1 tablet as needed Orally Once a day for 30 day(s), Disp: , Rfl:     tiZANidine (ZANAFLEX) 4 MG tablet, 1 tablet as needed Orally Three times a day for 30 days, Disp: , Rfl: